# Patient Record
Sex: FEMALE | HISPANIC OR LATINO | URBAN - METROPOLITAN AREA
[De-identification: names, ages, dates, MRNs, and addresses within clinical notes are randomized per-mention and may not be internally consistent; named-entity substitution may affect disease eponyms.]

---

## 2023-03-03 ENCOUNTER — OFFICE VISIT (OUTPATIENT)
Dept: OBGYN CLINIC | Facility: CLINIC | Age: 68
End: 2023-03-03

## 2023-03-03 VITALS
HEIGHT: 57 IN | HEART RATE: 81 BPM | WEIGHT: 100.8 LBS | DIASTOLIC BLOOD PRESSURE: 75 MMHG | SYSTOLIC BLOOD PRESSURE: 153 MMHG | BODY MASS INDEX: 21.75 KG/M2

## 2023-03-03 DIAGNOSIS — Z12.39 ENCOUNTER FOR BREAST CANCER SCREENING USING NON-MAMMOGRAM MODALITY: ICD-10-CM

## 2023-03-03 DIAGNOSIS — Z12.31 ENCOUNTER FOR SCREENING MAMMOGRAM FOR MALIGNANT NEOPLASM OF BREAST: ICD-10-CM

## 2023-03-03 DIAGNOSIS — Z01.419 ENCOUNTER FOR GYNECOLOGICAL EXAMINATION WITHOUT ABNORMAL FINDING: Primary | ICD-10-CM

## 2023-03-03 DIAGNOSIS — Z59.41 FOOD INSECURITY: ICD-10-CM

## 2023-03-03 DIAGNOSIS — Z12.4 SCREENING FOR CERVICAL CANCER: ICD-10-CM

## 2023-03-03 RX ORDER — LOSARTAN POTASSIUM 100 MG/1
25 TABLET ORAL DAILY
COMMUNITY

## 2023-03-03 RX ORDER — ROSUVASTATIN CALCIUM 10 MG/1
10 TABLET, COATED ORAL DAILY
COMMUNITY

## 2023-03-03 SDOH — ECONOMIC STABILITY - FOOD INSECURITY: FOOD INSECURITY: Z59.41

## 2023-03-03 NOTE — PATIENT INSTRUCTIONS
Darren por hooper confianza en nuestro equipo  Maddie Bryan y agradecemos jc comentarios  Si recibe ron encuesta nuestra, tómese unos momentos para informarnos cómo estamos     Sinceramente,  [phone]

## 2023-03-03 NOTE — PROGRESS NOTES
Mika Miranda is a 74 y o  female who presents today for annual GYN exam   Her last pap smear was performed 4 years ago and result was WNL per patient  She reports no history of abnormal pap smears in her past   Her last mammogram was performed 4 years ago and result was WNL per patient  She has never had colon cancer screening performed  She reports menses as absent since   Her general medical history has been reviewed and she reports it as follows:    Past Medical History:   Diagnosis Date   • Fatty liver    • Hyperlipidemia    • Hypertension      Past Surgical History:   Procedure Laterality Date   • EYE SURGERY     • TUBAL LIGATION Bilateral      OB History        4    Para   3    Term   3       0    AB   1    Living   3       SAB   1    IAB   0    Ectopic   0    Multiple   0    Live Births   3               Social History     Tobacco Use   • Smoking status: Never   • Smokeless tobacco: Never   Vaping Use   • Vaping Use: Never used   Substance Use Topics   • Alcohol use: Yes     Comment: couple times/year   • Drug use: Never     Social History     Substance and Sexual Activity   Sexual Activity Yes   • Partners: Male   • Birth control/protection: Female Sterilization, Post-menopausal     Cancer-related family history is negative for Breast cancer, Colon cancer, Ovarian cancer, and Cancer  Current Outpatient Medications   Medication Instructions   • losartan (COZAAR) 25 mg, Oral, Daily   • rosuvastatin (CRESTOR) 10 mg, Oral, Daily       Review of Systems:  Review of Systems   Constitutional: Negative  Gastrointestinal: Negative  Genitourinary: Negative for difficulty urinating, pelvic pain, vaginal bleeding and vaginal discharge  Skin: Negative          Physical Exam:  /75   Pulse 81   Ht 4' 9" (1 448 m)   Wt 45 7 kg (100 lb 12 8 oz)   BMI 21 81 kg/m²   Physical Exam  Constitutional:       General: She is not in acute distress  Appearance: She is well-developed  Genitourinary:      Vulva normal       No lesions in the vagina  Vaginal exam comments: Mild cystocele  Right Adnexa: not tender and no mass present  Left Adnexa: not tender and no mass present  No cervical motion tenderness or lesion  Uterus is prolapsed  Uterus is not tender  Breasts:     Right: No mass, nipple discharge, skin change or tenderness  Left: No mass, nipple discharge, skin change or tenderness  Neck:      Thyroid: No thyromegaly  Cardiovascular:      Rate and Rhythm: Normal rate and regular rhythm  Pulmonary:      Effort: Pulmonary effort is normal    Abdominal:      Palpations: Abdomen is soft  Tenderness: There is no abdominal tenderness  Musculoskeletal:      Cervical back: Neck supple  Neurological:      Mental Status: She is alert and oriented to person, place, and time  Skin:     General: Skin is warm and dry  Vitals reviewed  Assessment/Plan:   1  Normal well-woman GYN exam   2  Cervical cancer screening:  Normal cervical exam   Pap smear not indicated at this time  3  STD screening:  Patient declines  4  Breast cancer screening:  Normal breast exam   Order placed for bilateral screening mammogram   Reviewed breast self-awareness  5  Colon cancer screening:  Order placed for consultation with Gastroenterology for consultation to discuss screening  6  Depression Screening: Patient's depression screening was assessed with a PHQ-2 score of 0  Their PHQ-9 score was 2  Clinically patient does not have depression  No treatment is required  7  BMI Counseling: Body mass index is 21 81 kg/m²  No intervention indicated  8  Uterine prolapse/Cystocele:  Reviewed with patient that no intervention is indicated unless she is experiencing discomfort/pain/incontinence  She denies all     9  Return to office in 1 year for annual GYN exam

## 2024-03-08 ENCOUNTER — TELEPHONE (OUTPATIENT)
Dept: OBGYN CLINIC | Facility: CLINIC | Age: 69
End: 2024-03-08

## 2024-09-10 PROBLEM — I10 PRIMARY HYPERTENSION: Status: ACTIVE | Noted: 2024-09-10

## 2024-09-10 PROBLEM — Z00.00 ANNUAL PHYSICAL EXAM: Status: ACTIVE | Noted: 2024-09-10

## 2024-09-10 PROBLEM — E78.5 HYPERLIPIDEMIA: Status: ACTIVE | Noted: 2024-09-10
